# Patient Record
Sex: MALE | Race: OTHER | URBAN - METROPOLITAN AREA
[De-identification: names, ages, dates, MRNs, and addresses within clinical notes are randomized per-mention and may not be internally consistent; named-entity substitution may affect disease eponyms.]

---

## 2022-08-23 ENCOUNTER — OFFICE VISIT (OUTPATIENT)
Dept: INTERNAL MEDICINE CLINIC | Facility: CLINIC | Age: 52
End: 2022-08-23
Payer: COMMERCIAL

## 2022-08-23 VITALS
SYSTOLIC BLOOD PRESSURE: 178 MMHG | DIASTOLIC BLOOD PRESSURE: 98 MMHG | BODY MASS INDEX: 34.72 KG/M2 | RESPIRATION RATE: 19 BRPM | HEART RATE: 98 BPM | OXYGEN SATURATION: 97 % | HEIGHT: 71 IN | WEIGHT: 248 LBS | TEMPERATURE: 98 F

## 2022-08-23 DIAGNOSIS — E78.5 HYPERLIPIDEMIA, UNSPECIFIED HYPERLIPIDEMIA TYPE: ICD-10-CM

## 2022-08-23 DIAGNOSIS — Z82.49 FH: CAD (CORONARY ARTERY DISEASE): ICD-10-CM

## 2022-08-23 DIAGNOSIS — Z00.00 ENCOUNTER FOR PREVENTIVE CARE: ICD-10-CM

## 2022-08-23 DIAGNOSIS — R94.31 ABNORMAL EKG: ICD-10-CM

## 2022-08-23 DIAGNOSIS — M79.661 PAIN IN RIGHT SHIN: ICD-10-CM

## 2022-08-23 DIAGNOSIS — K21.9 GASTROESOPHAGEAL REFLUX DISEASE, UNSPECIFIED WHETHER ESOPHAGITIS PRESENT: ICD-10-CM

## 2022-08-23 DIAGNOSIS — Z00.00 ANNUAL PHYSICAL EXAM: Primary | ICD-10-CM

## 2022-08-23 DIAGNOSIS — I10 PRIMARY HYPERTENSION: ICD-10-CM

## 2022-08-23 LAB
ALBUMIN SERPL-MCNC: 3.9 G/DL (ref 3.4–5)
ALBUMIN/GLOB SERPL: 0.9 {RATIO} (ref 1–2)
ALP LIVER SERPL-CCNC: 96 U/L
ALT SERPL-CCNC: 56 U/L
ANION GAP SERPL CALC-SCNC: 7 MMOL/L (ref 0–18)
AST SERPL-CCNC: 28 U/L (ref 15–37)
BASOPHILS # BLD AUTO: 0.07 X10(3) UL (ref 0–0.2)
BASOPHILS NFR BLD AUTO: 0.8 %
BILIRUB SERPL-MCNC: 0.7 MG/DL (ref 0.1–2)
BUN BLD-MCNC: 15 MG/DL (ref 7–18)
BUN/CREAT SERPL: 13.3 (ref 10–20)
CALCIUM BLD-MCNC: 9.6 MG/DL (ref 8.5–10.1)
CHLORIDE SERPL-SCNC: 103 MMOL/L (ref 98–112)
CHOLEST SERPL-MCNC: 322 MG/DL (ref ?–200)
CO2 SERPL-SCNC: 26 MMOL/L (ref 21–32)
COMPLEXED PSA SERPL-MCNC: 0.36 NG/ML (ref ?–4)
CREAT BLD-MCNC: 1.13 MG/DL
DEPRECATED RDW RBC AUTO: 39.2 FL (ref 35.1–46.3)
EOSINOPHIL # BLD AUTO: 0.09 X10(3) UL (ref 0–0.7)
EOSINOPHIL NFR BLD AUTO: 1 %
ERYTHROCYTE [DISTWIDTH] IN BLOOD BY AUTOMATED COUNT: 11.9 % (ref 11–15)
FASTING PATIENT LIPID ANSWER: YES
FASTING STATUS PATIENT QL REPORTED: YES
GFR SERPLBLD BASED ON 1.73 SQ M-ARVRAT: 79 ML/MIN/1.73M2 (ref 60–?)
GLOBULIN PLAS-MCNC: 4.5 G/DL (ref 2.8–4.4)
GLUCOSE BLD-MCNC: 173 MG/DL (ref 70–99)
HCT VFR BLD AUTO: 50.3 %
HDLC SERPL-MCNC: 49 MG/DL (ref 40–59)
HGB BLD-MCNC: 16.8 G/DL
IMM GRANULOCYTES # BLD AUTO: 0.03 X10(3) UL (ref 0–1)
IMM GRANULOCYTES NFR BLD: 0.3 %
LDLC SERPL CALC-MCNC: 217 MG/DL (ref ?–100)
LYMPHOCYTES # BLD AUTO: 2.28 X10(3) UL (ref 1–4)
LYMPHOCYTES NFR BLD AUTO: 26.5 %
MCH RBC QN AUTO: 30.2 PG (ref 26–34)
MCHC RBC AUTO-ENTMCNC: 33.4 G/DL (ref 31–37)
MCV RBC AUTO: 90.5 FL
MONOCYTES # BLD AUTO: 0.6 X10(3) UL (ref 0.1–1)
MONOCYTES NFR BLD AUTO: 7 %
NEUTROPHILS # BLD AUTO: 5.53 X10 (3) UL (ref 1.5–7.7)
NEUTROPHILS # BLD AUTO: 5.53 X10(3) UL (ref 1.5–7.7)
NEUTROPHILS NFR BLD AUTO: 64.4 %
NONHDLC SERPL-MCNC: 273 MG/DL (ref ?–130)
OSMOLALITY SERPL CALC.SUM OF ELEC: 287 MOSM/KG (ref 275–295)
PLATELET # BLD AUTO: 335 10(3)UL (ref 150–450)
POTASSIUM SERPL-SCNC: 4.6 MMOL/L (ref 3.5–5.1)
PROT SERPL-MCNC: 8.4 G/DL (ref 6.4–8.2)
RBC # BLD AUTO: 5.56 X10(6)UL
SODIUM SERPL-SCNC: 136 MMOL/L (ref 136–145)
TRIGL SERPL-MCNC: 276 MG/DL (ref 30–149)
TSI SER-ACNC: 1.99 MIU/ML (ref 0.36–3.74)
VLDLC SERPL CALC-MCNC: 63 MG/DL (ref 0–30)
WBC # BLD AUTO: 8.6 X10(3) UL (ref 4–11)

## 2022-08-23 PROCEDURE — 3080F DIAST BP >= 90 MM HG: CPT | Performed by: INTERNAL MEDICINE

## 2022-08-23 PROCEDURE — 93000 ELECTROCARDIOGRAM COMPLETE: CPT | Performed by: INTERNAL MEDICINE

## 2022-08-23 PROCEDURE — 3077F SYST BP >= 140 MM HG: CPT | Performed by: INTERNAL MEDICINE

## 2022-08-23 PROCEDURE — 99386 PREV VISIT NEW AGE 40-64: CPT | Performed by: INTERNAL MEDICINE

## 2022-08-23 PROCEDURE — 36415 COLL VENOUS BLD VENIPUNCTURE: CPT | Performed by: INTERNAL MEDICINE

## 2022-08-23 PROCEDURE — 3008F BODY MASS INDEX DOCD: CPT | Performed by: INTERNAL MEDICINE

## 2022-08-23 RX ORDER — LISINOPRIL 10 MG/1
10 TABLET ORAL DAILY
Qty: 30 TABLET | Refills: 2 | Status: SHIPPED | OUTPATIENT
Start: 2022-08-23 | End: 2023-08-18

## 2022-08-24 ENCOUNTER — PATIENT MESSAGE (OUTPATIENT)
Dept: INTERNAL MEDICINE CLINIC | Facility: CLINIC | Age: 52
End: 2022-08-24

## 2022-08-25 NOTE — TELEPHONE ENCOUNTER
Pt informed of lab results and instructions to watch diet, pt did start the atorvastatin, and will watch diet better.

## 2022-09-09 ENCOUNTER — OFFICE VISIT (OUTPATIENT)
Dept: INTERNAL MEDICINE CLINIC | Facility: CLINIC | Age: 52
End: 2022-09-09
Payer: COMMERCIAL

## 2022-09-09 VITALS
TEMPERATURE: 98 F | RESPIRATION RATE: 18 BRPM | WEIGHT: 245 LBS | DIASTOLIC BLOOD PRESSURE: 78 MMHG | HEART RATE: 82 BPM | SYSTOLIC BLOOD PRESSURE: 122 MMHG | OXYGEN SATURATION: 98 % | HEIGHT: 71 IN | BODY MASS INDEX: 34.3 KG/M2

## 2022-09-09 DIAGNOSIS — R73.09 ELEVATED GLUCOSE: ICD-10-CM

## 2022-09-09 DIAGNOSIS — R77.8 ELEVATED TOTAL PROTEIN: ICD-10-CM

## 2022-09-09 DIAGNOSIS — I10 PRIMARY HYPERTENSION: Primary | ICD-10-CM

## 2022-09-09 PROCEDURE — 3008F BODY MASS INDEX DOCD: CPT | Performed by: INTERNAL MEDICINE

## 2022-09-09 PROCEDURE — 3074F SYST BP LT 130 MM HG: CPT | Performed by: INTERNAL MEDICINE

## 2022-09-09 PROCEDURE — 3078F DIAST BP <80 MM HG: CPT | Performed by: INTERNAL MEDICINE

## 2022-09-09 PROCEDURE — 3046F HEMOGLOBIN A1C LEVEL >9.0%: CPT | Performed by: INTERNAL MEDICINE

## 2022-09-09 PROCEDURE — 36415 COLL VENOUS BLD VENIPUNCTURE: CPT | Performed by: INTERNAL MEDICINE

## 2022-09-09 PROCEDURE — 99214 OFFICE O/P EST MOD 30 MIN: CPT | Performed by: INTERNAL MEDICINE

## 2022-09-10 ENCOUNTER — PATIENT MESSAGE (OUTPATIENT)
Dept: INTERNAL MEDICINE CLINIC | Facility: CLINIC | Age: 52
End: 2022-09-10

## 2022-09-10 DIAGNOSIS — E11.65 TYPE 2 DIABETES MELLITUS WITH HYPERGLYCEMIA, WITHOUT LONG-TERM CURRENT USE OF INSULIN (HCC): Primary | ICD-10-CM

## 2022-09-12 RX ORDER — GLIPIZIDE 5 MG/1
5 TABLET ORAL
Qty: 90 TABLET | Refills: 1 | Status: SHIPPED | OUTPATIENT
Start: 2022-09-12

## 2022-09-12 NOTE — TELEPHONE ENCOUNTER
From: Mando Farr  To: Layton Nicholas MD  Sent: 9/10/2022 2:56 PM CDT  Subject: Metformin prescription     Christin Hare your day is going well. I was wondering if there was any other options you could prescribe me. I have seen the Metformin (my wife takes it) have a lot of side effects. I appreciate the consideration.      Diaz Romo

## 2022-09-13 LAB
ALBUMIN SERPL ELPH-MCNC: 4.28 G/DL (ref 3.75–5.21)
ALBUMIN/GLOB SERPL: 1.42 {RATIO} (ref 1–2)
ALPHA1 GLOB SERPL ELPH-MCNC: 0.31 G/DL (ref 0.19–0.46)
ALPHA2 GLOB SERPL ELPH-MCNC: 0.73 G/DL (ref 0.48–1.05)
B-GLOBULIN SERPL ELPH-MCNC: 0.94 G/DL (ref 0.68–1.23)
GAMMA GLOB SERPL ELPH-MCNC: 1.04 G/DL (ref 0.62–1.7)
KAPPA LC FREE SER-MCNC: 1.84 MG/DL (ref 0.33–1.94)
KAPPA LC FREE/LAMBDA FREE SER NEPH: 1.66 {RATIO} (ref 0.26–1.65)
LAMBDA LC FREE SERPL-MCNC: 1.11 MG/DL (ref 0.57–2.63)
PROT SERPL-MCNC: 7.3 G/DL (ref 6.4–8.2)

## 2022-11-11 RX ORDER — LISINOPRIL 10 MG/1
10 TABLET ORAL DAILY
Qty: 90 TABLET | Refills: 1 | Status: SHIPPED | OUTPATIENT
Start: 2022-11-11

## 2022-11-11 NOTE — TELEPHONE ENCOUNTER
Refill passed per [facility] protocol   Requested Prescriptions   Pending Prescriptions Disp Refills    METOPROLOL TARTRATE 25 MG Oral Tab [Pharmacy Med Name: Metoprolol Tartrate 25 Mg Tab Auro] 60 tablet 0     Sig: TAKE ONE TABLET BY MOUTH TWICE DAILY       Hypertensive Medications Protocol Passed - 11/11/2022  2:31 AM        Passed - In person appointment in the past 12 or next 3 months     Recent Outpatient Visits              2 months ago Primary hypertension    [facility], [address], Amos Paz MD    Office Visit    2 months ago Annual physical exam    [facility], [address], Ron Ashton MD    Office Visit          Future Appointments         Provider Department Appt Notes    In 4 weeks Aileen Hsieh MD [facility], [address], [place] [facility]                Passed - Last BP reading less than 140/90     BP Readings from Last 1 Encounters:  09/09/22 : 122/78              Passed - CMP or BMP in past 6 months     Recent Results (from the past 4392 hour(s))   COMP METABOLIC PANEL (14)    Collection Time: 08/23/22  3:33 PM   Result Value Ref Range    Glucose 173 (H) 70 - 99 mg/dL    Sodium 136 136 - 145 mmol/L    Potassium 4.6 3.5 - 5.1 mmol/L    Chloride 103 98 - 112 mmol/L    CO2 26.0 21.0 - 32.0 mmol/L    Anion Gap 7 0 - 18 mmol/L    BUN 15 7 - 18 mg/dL    Creatinine 1.13 0.70 - 1.30 mg/dL    BUN/CREA Ratio 13.3 10.0 - 20.0    Calcium, Total 9.6 8.5 - 10.1 mg/dL    Calculated Osmolality 287 275 - 295 mOsm/kg    eGFR-Cr 79 >=60 mL/min/1.73m2    ALT 56 16 - 61 U/L    AST 28 15 - 37 U/L    Alkaline Phosphatase 96 45 - 117 U/L    Bilirubin, Total 0.7 0.1 - 2.0 mg/dL    Total Protein 8.4 (H) 6.4 - 8.2 g/dL    Albumin 3.9 3.4 - 5.0 g/dL    Globulin  4.5 (H) 2.8 - 4.4 g/dL    A/G Ratio 0.9 (L) 1.0 - 2.0    Patient Fasting for CMP? Yes      *Note: Due to a large number of results and/or encounters for the requested time period, some results have not been displayed.  A complete set of results can be found in Results Review.                Passed - In person appointment or virtual visit in the past 6 months     Recent Outpatient Visits              2 months ago Primary hypertension    Jane Farrell MD    Office Visit    2 months ago Annual physical exam    Brandan Roy MD    Office Visit          Future Appointments         Provider Department Appt Notes    In 4 weeks Marika Schuster MD [place], [address], [place] 3 [facility]                Passed - [facility] [address] or GFRNAA > 50     GFR Evaluation  EGFRCR: 79 , resulted on 8/23/2022            LISINOPRIL 10 MG Oral Tab [Pharmacy Med Name: Lisinopril 10 Mg Tab Solc] 30 tablet 0     Sig: TAKE ONE TABLET BY MOUTH ONE TIME DAILY       Hypertensive Medications Protocol Passed - 11/11/2022  2:31 AM        Passed - In person appointment in the past 12 or next 3 months     Recent Outpatient Visits              2 months ago Primary hypertension    Jane Farrell MD    Office Visit    2 months ago Annual physical exam    Brandan Roy MD    Office Visit          Future Appointments         Provider Department Appt Notes    In 4 weeks Marika Schuster MD [place], [address], [place] 3 [facility]                Passed - Last BP reading less than 140/90     BP Readings from Last 1 Encounters:  09/09/22 : 122/78              Passed - CMP or BMP in past 6 months     Recent Results (from the past 4392 hour(s))   COMP METABOLIC PANEL (14)    Collection Time: 08/23/22  3:33 PM   Result Value Ref Range    Glucose 173 (H) 70 - 99 mg/dL    Sodium 136 136 - 145 mmol/L    Potassium 4.6 3.5 - 5.1 mmol/L    Chloride 103 98 - 112 mmol/L    CO2 26.0 21.0 - 32.0 mmol/L    Anion Gap 7 0 - 18 mmol/L    BUN 15 7 - 18 mg/dL    Creatinine 1.13 0.70 - 1.30 mg/dL    BUN/CREA Ratio 13.3 10.0 - 20.0 Calcium, Total 9.6 8.5 - 10.1 mg/dL    Calculated Osmolality 287 275 - 295 mOsm/kg    eGFR-Cr 79 >=60 mL/min/1.73m2    ALT 56 16 - 61 U/L    AST 28 15 - 37 U/L    Alkaline Phosphatase 96 45 - 117 U/L    Bilirubin, Total 0.7 0.1 - 2.0 mg/dL    Total Protein 8.4 (H) 6.4 - 8.2 g/dL    Albumin 3.9 3.4 - 5.0 g/dL    Globulin  4.5 (H) 2.8 - 4.4 g/dL    A/G Ratio 0.9 (L) 1.0 - 2.0    Patient Fasting for CMP? Yes      *Note: Due to a large number of results and/or encounters for the requested time period, some results have not been displayed. A complete set of results can be found in Results Review.                Passed - In person appointment or virtual visit in the past 6 months     Recent Outpatient Visits              2 months ago Primary hypertension    Jhoana Jack MD    Office Visit    2 months ago Annual physical exam    Jacques Feldman MD    Office Visit          Future Appointments         Provider Department Appt Notes    In 4 weeks Buck Jose MD [facility], [facility] [place], [place] 3 [facility]                Passed - [facility] or GFRNAA > 50     GFR Evaluation  EGFRCR: 79 , resulted on 8/23/2022

## 2022-12-09 ENCOUNTER — OFFICE VISIT (OUTPATIENT)
Dept: INTERNAL MEDICINE CLINIC | Facility: CLINIC | Age: 52
End: 2022-12-09
Payer: COMMERCIAL

## 2022-12-09 VITALS
DIASTOLIC BLOOD PRESSURE: 52 MMHG | HEIGHT: 71 IN | RESPIRATION RATE: 17 BRPM | HEART RATE: 55 BPM | WEIGHT: 235 LBS | TEMPERATURE: 98 F | SYSTOLIC BLOOD PRESSURE: 98 MMHG | BODY MASS INDEX: 32.9 KG/M2 | OXYGEN SATURATION: 99 %

## 2022-12-09 DIAGNOSIS — E78.5 HYPERLIPIDEMIA, UNSPECIFIED HYPERLIPIDEMIA TYPE: ICD-10-CM

## 2022-12-09 DIAGNOSIS — I10 PRIMARY HYPERTENSION: ICD-10-CM

## 2022-12-09 DIAGNOSIS — E11.65 TYPE 2 DIABETES MELLITUS WITH HYPERGLYCEMIA, WITHOUT LONG-TERM CURRENT USE OF INSULIN (HCC): Primary | ICD-10-CM

## 2022-12-09 LAB
CHOLEST SERPL-MCNC: 148 MG/DL (ref ?–200)
FASTING PATIENT LIPID ANSWER: YES
HDLC SERPL-MCNC: 51 MG/DL (ref 40–59)
LDLC SERPL CALC-MCNC: 82 MG/DL (ref ?–100)
NONHDLC SERPL-MCNC: 97 MG/DL (ref ?–130)
TRIGL SERPL-MCNC: 79 MG/DL (ref 30–149)
VLDLC SERPL CALC-MCNC: 12 MG/DL (ref 0–30)

## 2022-12-09 PROCEDURE — 90715 TDAP VACCINE 7 YRS/> IM: CPT | Performed by: INTERNAL MEDICINE

## 2022-12-09 PROCEDURE — 3078F DIAST BP <80 MM HG: CPT | Performed by: INTERNAL MEDICINE

## 2022-12-09 PROCEDURE — 99214 OFFICE O/P EST MOD 30 MIN: CPT | Performed by: INTERNAL MEDICINE

## 2022-12-09 PROCEDURE — 3008F BODY MASS INDEX DOCD: CPT | Performed by: INTERNAL MEDICINE

## 2022-12-09 PROCEDURE — 36415 COLL VENOUS BLD VENIPUNCTURE: CPT | Performed by: INTERNAL MEDICINE

## 2022-12-09 PROCEDURE — 90471 IMMUNIZATION ADMIN: CPT | Performed by: INTERNAL MEDICINE

## 2022-12-09 PROCEDURE — 3074F SYST BP LT 130 MM HG: CPT | Performed by: INTERNAL MEDICINE

## 2022-12-10 LAB
EST. AVERAGE GLUCOSE BLD GHB EST-MCNC: 134 MG/DL (ref 68–126)
HBA1C MFR BLD: 6.3 % (ref ?–5.7)

## 2023-03-11 RX ORDER — GLIPIZIDE 5 MG/1
5 TABLET ORAL
Qty: 90 TABLET | Refills: 3 | Status: SHIPPED | OUTPATIENT
Start: 2023-03-11

## 2023-03-11 NOTE — TELEPHONE ENCOUNTER
Refill passed per [place] [employer] protocol.      Requested Prescriptions   Pending Prescriptions Disp Refills    glipiZIDE 5 MG Oral Tab 90 tablet 1     Sig: Take 1 tablet (5 mg total) by mouth every morning before breakfast.       Diabetes Medication Protocol Passed - 3/11/2023  6:46 AM        Passed - Last A1C < 7.5 and within past 6 months     Lab Results   Component Value Date    A1C 6.3 (H) 12/09/2022             Passed - In person appointment or virtual visit in the past 6 mos or appointment in next 3 mos     Recent Outpatient Visits              3 months ago Type 2 diabetes mellitus with hyperglycemia, without long-term current use of insulin ([facility] [place] [address]    Urban Chaney MD    Office Visit    6 months ago Primary hypertension    [address], [address] [facility],[address], Urban Phan MD    Office Visit    6 months ago Annual physical exam    Urban Chaney MD    Office Visit          Future Appointments         Provider Department Appt Notes    In 6 days Pee Campos MD [facility], [address], [place] 3 months, [id number]               Passed - EGFRCR or GFRNAA > 50     GFR Evaluation  EGFRCR: 79 , resulted on 8/23/2022          Passed - GFR in the past 12 months              Recent Outpatient Visits              3 months ago Type 2 diabetes mellitus with hyperglycemia, without long-term current use of insulin ([facility])    Urban Chaney MD    Office Visit    6 months ago Primary hypertension    Urban Chaney MD    Office Visit    6 months ago Annual physical exam    [address], [address], Urban Phan MD    Office Visit             Future Appointments         Provider Department Appt Notes    In 6 days [facility], [address] [place] [address],[address], [place] 3 months, [address]

## 2023-03-17 ENCOUNTER — OFFICE VISIT (OUTPATIENT)
Dept: INTERNAL MEDICINE CLINIC | Facility: CLINIC | Age: 53
End: 2023-03-17

## 2023-03-17 VITALS
DIASTOLIC BLOOD PRESSURE: 78 MMHG | BODY MASS INDEX: 33.46 KG/M2 | WEIGHT: 239 LBS | HEART RATE: 76 BPM | OXYGEN SATURATION: 98 % | RESPIRATION RATE: 18 BRPM | HEIGHT: 71 IN | SYSTOLIC BLOOD PRESSURE: 124 MMHG | TEMPERATURE: 98 F

## 2023-03-17 DIAGNOSIS — I10 PRIMARY HYPERTENSION: ICD-10-CM

## 2023-03-17 DIAGNOSIS — Z00.00 ENCOUNTER FOR PREVENTIVE CARE: ICD-10-CM

## 2023-03-17 DIAGNOSIS — E11.65 TYPE 2 DIABETES MELLITUS WITH HYPERGLYCEMIA, WITHOUT LONG-TERM CURRENT USE OF INSULIN (HCC): Primary | ICD-10-CM

## 2023-03-17 DIAGNOSIS — E78.5 HYPERLIPIDEMIA, UNSPECIFIED HYPERLIPIDEMIA TYPE: ICD-10-CM

## 2023-03-17 PROCEDURE — 90677 PCV20 VACCINE IM: CPT | Performed by: INTERNAL MEDICINE

## 2023-03-17 PROCEDURE — 99214 OFFICE O/P EST MOD 30 MIN: CPT | Performed by: INTERNAL MEDICINE

## 2023-03-17 PROCEDURE — 90750 HZV VACC RECOMBINANT IM: CPT | Performed by: INTERNAL MEDICINE

## 2023-03-17 PROCEDURE — 90471 IMMUNIZATION ADMIN: CPT | Performed by: INTERNAL MEDICINE

## 2023-03-17 PROCEDURE — 90472 IMMUNIZATION ADMIN EACH ADD: CPT | Performed by: INTERNAL MEDICINE

## 2023-03-17 PROCEDURE — 3008F BODY MASS INDEX DOCD: CPT | Performed by: INTERNAL MEDICINE

## 2023-03-17 PROCEDURE — 3078F DIAST BP <80 MM HG: CPT | Performed by: INTERNAL MEDICINE

## 2023-03-17 PROCEDURE — 3074F SYST BP LT 130 MM HG: CPT | Performed by: INTERNAL MEDICINE

## 2023-05-09 RX ORDER — LISINOPRIL 10 MG/1
10 TABLET ORAL DAILY
Qty: 90 TABLET | Refills: 3 | Status: SHIPPED | OUTPATIENT
Start: 2023-05-09

## 2023-05-09 NOTE — TELEPHONE ENCOUNTER
Please review; protocol failed/ no protocol  Medication pended for your review and approval.    Requested Prescriptions   Pending Prescriptions Disp Refills    METOPROLOL TARTRATE 25 MG Oral Tab [Pharmacy Med Name: Metoprolol Tartrate 25 Mg Tab Auro] 180 tablet 0     Sig: Take 1 tablet (25 mg total) by mouth 2 (two) times daily. Hypertensive Medications Protocol Failed - 5/8/2023  1:31 AM        Failed - CMP or BMP in past 6 months     No results found for this or any previous visit (from the past 4392 hour(s)).               Passed - In person appointment in the past 12 or next 3 months     Recent Outpatient Visits              1 month ago Type 2 diabetes mellitus with hyperglycemia, without long-term current use of insulin ([facility])    Manfred Johnson, [address], Amos Paz MD    Office Visit    5 months ago Type 2 diabetes mellitus with hyperglycemia, without long-term current use of insulin ([facility])    Salbador Graham MD    Office Visit    8 months ago Primary hypertension    [facility], [address], Salbador Hammond MD    Office Visit    8 months ago Annual physical exam    Salbador Graham MD    Office Visit          Future Appointments         Provider Department Appt Notes    In 1 month Marzena Pelletier MD [facility], [address], [place]                Passed - Last BP reading less than 140/90     BP Readings from Last 1 Encounters:  03/17/23 : 124/78                Passed - In person appointment or virtual visit in the past 6 months     Recent Outpatient Visits              1 month ago Type 2 diabetes mellitus with hyperglycemia, without long-term current use of insulin [facility])    Brandi Graham Agron B, MD    Office Visit    5 months ago Type 2 diabetes mellitus with hyperglycemia, without long-term current use of insulin (HCC)    Frances Anaya MD    Office Visit    8 months ago Primary hypertension    [facility], [address], Frances Bianchi MD    Office Visit    8 months ago Annual physical exam    Frances Anaya MD    Office Visit          Future Appointments         Provider Department Appt Notes    [facility] month [facility], [address], [place]                Passed - [facility] > 50     GFR Evaluation  EGFRCR: 79 , resulted on 8/23/2022              LISINOPRIL 10 MG Oral Tab [Pharmacy Med Name: Lisinopril 10 Mg Tab Solc] 90 tablet 0     Sig: Take 1 tablet (10 mg total) by mouth daily. Hypertensive Medications Protocol Failed - 5/8/2023  1:31 AM        Failed - CMP or BMP in past 6 months     No results found for this or any previous visit (from the past 4392 hour(s)).               Passed - In person appointment in the past 12 or next 3 months     Recent Outpatient Visits              1 month ago Type 2 diabetes mellitus with hyperglycemia, without long-term current use of insulin (HCC)    Frances Anaya MD    Office Visit    5 months ago Type 2 diabetes mellitus with hyperglycemia, without long-term current use of insulin (HCC)    Frances Anaya MD    Office Visit    8 months ago Primary hypertension    Frances Anaya MD    Office Visit    8 months ago Annual physical exam    Frances Anaya MD    Office Visit          Future Appointments         Provider Department Appt Notes    In 1 month [facility], [address], [place]                Passed - Last BP reading less than 140/90     BP Readings from Last 1 Encounters:  03/17/23 : 124/78                Passed - In person appointment or virtual visit in the past 6 months     Recent Outpatient Visits              1 month ago Type 2 diabetes mellitus with hyperglycemia, without long-term current use of insulin ([facility] [address]    [address] MD    Office Visit    5 months ago Type 2 diabetes mellitus with hyperglycemia, without long-term current use of insulin ([facility])    [address] MD    Office Visit    8 months ago Primary hypertension    [address] MD    Office Visit    8 months ago Annual physical exam    [address] MD    Office Visit          Future Appointments         Provider Department Appt Notes    In 1 month MD [facility]un [facility], [address] [place] [address],[address], [place]                Passed - [facility] or [facility] > 50     GFR Evaluation  EGFRCR: 79 , resulted on 8/23/2022

## 2023-06-11 ENCOUNTER — E-VISIT (OUTPATIENT)
Dept: TELEHEALTH | Age: 53
End: 2023-06-11
Payer: COMMERCIAL

## 2023-06-11 DIAGNOSIS — R19.7 DIARRHEA, UNSPECIFIED TYPE: Primary | ICD-10-CM

## 2023-06-11 PROCEDURE — 99421 OL DIG E/M SVC 5-10 MIN: CPT | Performed by: NURSE PRACTITIONER

## 2023-07-10 ENCOUNTER — HOSPITAL ENCOUNTER (OUTPATIENT)
Dept: CV DIAGNOSTICS | Facility: HOSPITAL | Age: 53
Discharge: HOME | End: 2023-07-10
Attending: INTERNAL MEDICINE
Payer: COMMERCIAL

## 2023-07-10 ENCOUNTER — TELEPHONE (OUTPATIENT)
Dept: INTERNAL MEDICINE CLINIC | Facility: CLINIC | Age: 53
End: 2023-07-10

## 2023-07-10 ENCOUNTER — PATIENT MESSAGE (OUTPATIENT)
Dept: INTERNAL MEDICINE CLINIC | Facility: CLINIC | Age: 53
End: 2023-07-10

## 2023-07-10 DIAGNOSIS — E78.5 HYPERLIPIDEMIA, UNSPECIFIED HYPERLIPIDEMIA TYPE: ICD-10-CM

## 2023-07-10 DIAGNOSIS — Z82.49 FH: CAD (CORONARY ARTERY DISEASE): ICD-10-CM

## 2023-07-10 DIAGNOSIS — R94.31 ABNORMAL EKG: ICD-10-CM

## 2023-07-10 DIAGNOSIS — I10 PRIMARY HYPERTENSION: ICD-10-CM

## 2023-07-10 LAB
% OF MAX PREDICTED HR: 100 %
MAX DIASTOLIC BP: 84 MMHG
MAX HEART RATE: 100 BPM
MAX PREDICTED HEART RATE: 168 BPM
MAX SYSTOLIC BP: 168 MMHG
MAX WORK LOAD: 101

## 2023-07-10 PROCEDURE — 93018 CV STRESS TEST I&R ONLY: CPT | Performed by: INTERNAL MEDICINE

## 2023-07-10 PROCEDURE — 93016 CV STRESS TEST SUPVJ ONLY: CPT | Performed by: INTERNAL MEDICINE

## 2023-07-10 PROCEDURE — 93017 CV STRESS TEST TRACING ONLY: CPT | Performed by: INTERNAL MEDICINE

## 2023-07-10 NOTE — TELEPHONE ENCOUNTER
Called he came in today for treadmill stress test abnormal test results.  Cardiologist was informed of patient test that should follow up with pcp for further follow up

## 2023-07-10 NOTE — IMAGING NOTE
Patient presented for GXT (Regular Treadmill Stress Test) for abnormal EKG/abnormal lab workup. Patient exercised for 7:30 minutes, at peak exercise EKG changes noted, patient requested treadmill to be stopped due to leg fatigue and cramping, 59% of target heart rate achieved, max heart rate of 100 achieved at peak exercise. Patient did not experience any chest pain during exercise. Dr. Reymundo Gongora contacted regarding the above results, per MD ok for patient to go home and follow up with ordering MD Dr. Jc Banks. All questions answered and patient walked out of department by this RN, endorsed feeling well and continued to have no symptoms. This RN will call ordering MD office and inform them of the above results.

## 2023-07-10 NOTE — TELEPHONE ENCOUNTER
Per chart review, nurse spoke with patient regarding results. No further action needed. Per card EKG treadmill test 07/10/23:   Gurwinder Gilbert RN  7/10/2023  2:17 PM CDT       Verified name and . Patient was advised of the provider's result notes. Patient verbalizes understanding and agrees with plan. [employer] message sent with [facility] phone number as requested. Marcia Tristan. Ezio Veloz MD  7/10/2023  1:33 PM CDT       Stress test is abnormal.  Needs to get into cardiology ASAP. If he is he is having symptoms he should go to the emergency room.

## 2023-07-11 NOTE — TELEPHONE ENCOUNTER
From: Bhupendra Starks  To: Nidhi Hernandez MD  Sent: 7/10/2023 3:16 PM CDT  Subject: Question regarding CARD EKG TREADMILL TRACING    I have scheduled a 1pm appointment on Wednesday July 12th with Doctor Jean Herrera at [address] [place] [address] in [place]. Can you please send over my test results to him?  Thank you    Jony March

## 2023-07-12 ENCOUNTER — ORDER TRANSCRIPTION (OUTPATIENT)
Dept: ADMINISTRATIVE | Facility: HOSPITAL | Age: 53
End: 2023-07-12

## 2023-07-12 DIAGNOSIS — R94.39 ABNORMAL STRESS TEST: ICD-10-CM

## 2023-07-12 DIAGNOSIS — I10 PRIMARY HYPERTENSION: ICD-10-CM

## 2023-07-12 DIAGNOSIS — R68.84 JAW PAIN: Primary | ICD-10-CM

## 2023-07-13 PROBLEM — E78.2 HYPERLIPIDEMIA, MIXED: Status: ACTIVE | Noted: 2023-07-12

## 2023-07-13 PROBLEM — R94.39 ABNORMAL STRESS TEST: Status: ACTIVE | Noted: 2023-07-12

## 2023-07-13 PROBLEM — I10 PRIMARY HYPERTENSION: Status: ACTIVE | Noted: 2023-07-12

## 2023-07-25 ENCOUNTER — LAB ENCOUNTER (OUTPATIENT)
Dept: LAB | Facility: HOSPITAL | Age: 53
End: 2023-07-25
Attending: INTERNAL MEDICINE
Payer: COMMERCIAL

## 2023-07-25 DIAGNOSIS — E11.65 TYPE 2 DIABETES MELLITUS WITH HYPERGLYCEMIA, WITHOUT LONG-TERM CURRENT USE OF INSULIN (HCC): ICD-10-CM

## 2023-07-25 LAB
CHOLEST SERPL-MCNC: 149 MG/DL (ref ?–200)
FASTING PATIENT LIPID ANSWER: YES
HDLC SERPL-MCNC: 57 MG/DL (ref 40–59)
LDLC SERPL CALC-MCNC: 76 MG/DL (ref ?–100)
NONHDLC SERPL-MCNC: 92 MG/DL (ref ?–130)
TRIGL SERPL-MCNC: 84 MG/DL (ref 30–149)
VLDLC SERPL CALC-MCNC: 13 MG/DL (ref 0–30)

## 2023-07-25 PROCEDURE — 80061 LIPID PANEL: CPT

## 2023-07-25 PROCEDURE — 83036 HEMOGLOBIN GLYCOSYLATED A1C: CPT | Performed by: INTERNAL MEDICINE

## 2023-07-25 PROCEDURE — 3044F HG A1C LEVEL LT 7.0%: CPT | Performed by: INTERNAL MEDICINE

## 2023-07-25 PROCEDURE — 36415 COLL VENOUS BLD VENIPUNCTURE: CPT | Performed by: INTERNAL MEDICINE

## 2023-07-25 PROCEDURE — 80053 COMPREHEN METABOLIC PANEL: CPT | Performed by: INTERNAL MEDICINE

## 2023-07-26 ENCOUNTER — OFFICE VISIT (OUTPATIENT)
Dept: INTERNAL MEDICINE CLINIC | Facility: CLINIC | Age: 53
End: 2023-07-26

## 2023-07-26 VITALS
TEMPERATURE: 98 F | HEIGHT: 71 IN | WEIGHT: 241 LBS | SYSTOLIC BLOOD PRESSURE: 134 MMHG | DIASTOLIC BLOOD PRESSURE: 85 MMHG | HEART RATE: 72 BPM | RESPIRATION RATE: 16 BRPM | BODY MASS INDEX: 33.74 KG/M2 | OXYGEN SATURATION: 97 %

## 2023-07-26 DIAGNOSIS — R94.39 ABNORMAL STRESS TEST: ICD-10-CM

## 2023-07-26 DIAGNOSIS — E78.5 HYPERLIPIDEMIA, UNSPECIFIED HYPERLIPIDEMIA TYPE: ICD-10-CM

## 2023-07-26 DIAGNOSIS — R79.89 ELEVATED LFTS: ICD-10-CM

## 2023-07-26 DIAGNOSIS — E11.65 TYPE 2 DIABETES MELLITUS WITH HYPERGLYCEMIA, WITHOUT LONG-TERM CURRENT USE OF INSULIN (HCC): Primary | ICD-10-CM

## 2023-07-26 DIAGNOSIS — I10 PRIMARY HYPERTENSION: ICD-10-CM

## 2023-07-26 PROCEDURE — 3075F SYST BP GE 130 - 139MM HG: CPT | Performed by: INTERNAL MEDICINE

## 2023-07-26 PROCEDURE — 3008F BODY MASS INDEX DOCD: CPT | Performed by: INTERNAL MEDICINE

## 2023-07-26 PROCEDURE — 3079F DIAST BP 80-89 MM HG: CPT | Performed by: INTERNAL MEDICINE

## 2023-07-26 PROCEDURE — 99214 OFFICE O/P EST MOD 30 MIN: CPT | Performed by: INTERNAL MEDICINE

## 2023-07-26 RX ORDER — GLIPIZIDE 5 MG/1
5 TABLET ORAL
Qty: 90 TABLET | Refills: 3 | Status: SHIPPED | OUTPATIENT
Start: 2023-07-26

## 2023-07-26 RX ORDER — LISINOPRIL 10 MG/1
10 TABLET ORAL DAILY
Qty: 90 TABLET | Refills: 3 | Status: SHIPPED | OUTPATIENT
Start: 2023-07-26

## 2023-07-26 RX ORDER — ATORVASTATIN CALCIUM 40 MG/1
40 TABLET, FILM COATED ORAL NIGHTLY
Qty: 90 TABLET | Refills: 1 | Status: SHIPPED | OUTPATIENT
Start: 2023-07-26

## 2023-07-26 NOTE — PROGRESS NOTES
Subjective:     Patient ID: Yonatan Royal is a 46year old male. HPI  Patient comes in for follow-up overall doing okay denies any complaints seeing cardiology CTA was ordered , taking his medications denies any chest pain shortness of breath recent labs noted A1c current cholesterol GERD mild elevated LFTs recommended to recheck patient says in the past has been told to have fatty liver. History/Other:   Review of Systems   Constitutional: Negative. HENT: Negative. Eyes: Negative. Respiratory: Negative. Cardiovascular: Negative. Gastrointestinal: Negative. Genitourinary: Negative. Musculoskeletal: Negative. Skin: Negative. Neurological: Negative. Psychiatric/Behavioral: Negative. Current Outpatient Medications   Medication Sig Dispense Refill    metoprolol tartrate 25 MG Oral Tab Take 1 tablet (25 mg total) by mouth 2 (two) times daily. 180 tablet 3    lisinopril 10 MG Oral Tab Take 1 tablet (10 mg total) by mouth daily. 90 tablet 3    glipiZIDE 5 MG Oral Tab Take 1 tablet (5 mg total) by mouth every morning before breakfast. 90 tablet 3    atorvastatin 40 MG Oral Tab Take 1 tablet (40 mg total) by mouth nightly. 90 tablet 1     Allergies:No Known Allergies    Past Medical History:   Diagnosis Date    Allergic rhinitis     Essential hypertension     Hyperlipidemia     Hypertension     Type 2 diabetes mellitus ([address]       No past surgical history on file.    Family History   Problem Relation Age of Onset    No Known Problems Mother     Heart Attack Father         26 pased away    Hypertension Father       Social History:   Social History     Socioeconomic History    Marital status:    Tobacco Use    Smoking status: Passive Smoke Exposure - Never Smoker    Smokeless tobacco: Former   Vaping Use    Vaping Use: Never used   Substance and Sexual Activity    Alcohol use: Yes    Drug use: Never    Sexual activity: Never        Objective:   Physical Exam  Vitals and nursing note reviewed. Constitutional:       Appearance: He is well-developed. HENT:      Head: Normocephalic and atraumatic. Right Ear: External ear normal.      Left Ear: External ear normal.      Nose: Nose normal.   Eyes:      Conjunctiva/sclera: Conjunctivae normal.      Pupils: Pupils are equal, round, and reactive to light. Cardiovascular:      Rate and Rhythm: Normal rate and regular rhythm. Heart sounds: Normal heart sounds. Pulmonary:      Effort: Pulmonary effort is normal.      Breath sounds: Normal breath sounds. Abdominal:      General: Bowel sounds are normal.      Palpations: Abdomen is soft. Genitourinary:     Penis: Normal.       Prostate: Normal.      Rectum: Normal.   Musculoskeletal:         General: Normal range of motion. Cervical back: Normal range of motion and neck supple. Skin:     General: Skin is warm and dry. Neurological:      Mental Status: He is alert and oriented to person, place, and time. Deep Tendon Reflexes: Reflexes are normal and symmetric. Assessment & Plan:   Type 2 diabetes mellitus with hyperglycemia, without long-term current use of insulin (HCC)  (primary encounter diagnosis) well-controlled continue current treatment watch diet  Elevated LFTs we will retest patient in the past has been told to have fatty liver we will follow results may need to see GI if numbers of  Hyperlipidemia, unspecified hyperlipidemia type continue current treatment  Primary hypertension continue current treatment well-controlled  Abnormal stress test patient follows with cardiology schedule CTA taking aspirin and other medications    No orders of the defined types were placed in this encounter. Meds This Visit:  Requested Prescriptions     Signed Prescriptions Disp Refills    metoprolol tartrate 25 MG Oral Tab 180 tablet 3     Sig: Take 1 tablet (25 mg total) by mouth 2 (two) times daily.     lisinopril 10 MG Oral Tab 90 tablet 3     Sig: Take 1 tablet (10 mg total) by mouth daily. glipiZIDE 5 MG Oral Tab 90 tablet 3     Sig: Take 1 tablet (5 mg total) by mouth every morning before breakfast.    atorvastatin 40 MG Oral Tab 90 tablet 1     Sig: Take 1 tablet (40 mg total) by mouth nightly.        Imaging & Referrals:  None

## 2024-01-30 RX ORDER — GLIPIZIDE 5 MG/1
5 TABLET ORAL
Qty: 90 TABLET | Refills: 3 | Status: SHIPPED | OUTPATIENT
Start: 2024-01-30

## 2024-01-30 RX ORDER — ATORVASTATIN CALCIUM 40 MG/1
40 TABLET, FILM COATED ORAL NIGHTLY
Qty: 90 TABLET | Refills: 1 | Status: SHIPPED | OUTPATIENT
Start: 2024-01-30

## 2024-01-30 RX ORDER — LISINOPRIL 10 MG/1
10 TABLET ORAL DAILY
Qty: 90 TABLET | Refills: 3 | Status: SHIPPED | OUTPATIENT
Start: 2024-01-30